# Patient Record
Sex: MALE | Race: WHITE | Employment: FULL TIME | URBAN - NONMETROPOLITAN AREA
[De-identification: names, ages, dates, MRNs, and addresses within clinical notes are randomized per-mention and may not be internally consistent; named-entity substitution may affect disease eponyms.]

---

## 2020-08-22 ENCOUNTER — OFFICE VISIT (OUTPATIENT)
Dept: PRIMARY CARE CLINIC | Age: 35
End: 2020-08-22

## 2020-08-22 VITALS
TEMPERATURE: 98 F | OXYGEN SATURATION: 98 % | DIASTOLIC BLOOD PRESSURE: 80 MMHG | HEART RATE: 74 BPM | WEIGHT: 151 LBS | SYSTOLIC BLOOD PRESSURE: 112 MMHG

## 2020-08-22 PROCEDURE — 99212 OFFICE O/P EST SF 10 MIN: CPT

## 2020-08-22 PROCEDURE — 99202 OFFICE O/P NEW SF 15 MIN: CPT | Performed by: FAMILY MEDICINE

## 2020-08-22 RX ORDER — POLYMYXIN B SULFATE AND TRIMETHOPRIM 1; 10000 MG/ML; [USP'U]/ML
1 SOLUTION OPHTHALMIC EVERY 6 HOURS
Qty: 10 ML | Refills: 0 | Status: SHIPPED | OUTPATIENT
Start: 2020-08-22 | End: 2020-08-27

## 2020-08-22 ASSESSMENT — PATIENT HEALTH QUESTIONNAIRE - PHQ9
SUM OF ALL RESPONSES TO PHQ QUESTIONS 1-9: 0
SUM OF ALL RESPONSES TO PHQ QUESTIONS 1-9: 0
1. LITTLE INTEREST OR PLEASURE IN DOING THINGS: 0
2. FEELING DOWN, DEPRESSED OR HOPELESS: 0
SUM OF ALL RESPONSES TO PHQ9 QUESTIONS 1 & 2: 0

## 2020-08-22 ASSESSMENT — ENCOUNTER SYMPTOMS
DIARRHEA: 0
NAUSEA: 0
SHORTNESS OF BREATH: 0
COUGH: 0
EYE DISCHARGE: 0
DOUBLE VISION: 0
ABDOMINAL PAIN: 0
PHOTOPHOBIA: 1
EYE REDNESS: 1
SINUS PRESSURE: 0
EYE PAIN: 1
SORE THROAT: 0

## 2020-08-22 ASSESSMENT — VISUAL ACUITY: OU: 1

## 2020-08-22 NOTE — PROGRESS NOTES
[address] [place] [address]  Dept: [phone]  Dept Fax: [phone]  Loc: [phone]    Annie Box is a 28 y.o. male who presents today for his medical conditions/complaints as notedbelow. Annie Box is c/o of   Chief Complaint   Patient presents with    Eye Pain     right eye        HPI:     Conjunctivitis    The current episode started yesterday. The onset was sudden. The problem occurs continuously. The problem has been gradually worsening. The problem is moderate. Nothing relieves the symptoms. The symptoms are aggravated by light. Associated symptoms include photophobia, headaches, eye pain and eye redness. Pertinent negatives include no fever, no decreased vision, no double vision, no abdominal pain, no diarrhea, no nausea, no congestion, no ear discharge, no ear pain, no hearing loss, no sore throat, no cough, no URI, no rash and no eye discharge. The eye pain is moderate. The right eye is affected. The eye pain is not associated with movement. The eyelid exhibits swelling. There were no sick contacts. Possible foreign body    History reviewed. No pertinent past medical history. Social History     Tobacco Use    Smoking status: Current Every Day Smoker    Smokeless tobacco: Never Used   Substance Use Topics    Alcohol use: Not on file     Current Outpatient Medications   Medication Sig Dispense Refill    trimethoprim-polymyxin b (POLYTRIM) 55715-0.1 UNIT/ML-% ophthalmic solution Place 1 drop into the right eye every 6 hours for 5 days 10 mL 0     No current facility-administered medications for this visit. No Known Allergies    Subjective:     Review of Systems   Constitutional: Negative for activity change, appetite change, chills, fatigue and fever.    HENT: Negative for congestion, ear discharge, ear pain, hearing loss, postnasal drip, sinus pressure, sneezing and sore throat. Eyes: Positive for photophobia, pain and redness. Negative for double vision, discharge and visual disturbance. Respiratory: Negative for cough and shortness of breath. Cardiovascular: Negative for chest pain and palpitations. Gastrointestinal: Negative for abdominal pain, diarrhea and nausea. Skin: Negative for rash. Neurological: Positive for headaches. Objective:      Physical Exam  Vitals signs and nursing note reviewed. Constitutional:       Appearance: He is well-developed. HENT:      Right Ear: Tympanic membrane, ear canal and external ear normal.      Left Ear: Tympanic membrane, ear canal and external ear normal.      Nose: Mucosal edema present. Right Sinus: No maxillary sinus tenderness or frontal sinus tenderness. Left Sinus: No maxillary sinus tenderness or frontal sinus tenderness. Mouth/Throat:      Pharynx: Posterior oropharyngeal erythema present. Eyes:      General: Lids are everted, no foreign bodies appreciated. Vision grossly intact. Conjunctiva/sclera: Conjunctivae normal.      Slit lamp exam:     Right eye: Photophobia present. No corneal ulcer or foreign body. Comments: Right upper eyelid is swollen. Small scratch on cornea   Neck:      Musculoskeletal: Normal range of motion and neck supple. Cardiovascular:      Rate and Rhythm: Normal rate and regular rhythm. Heart sounds: Normal heart sounds. No murmur. Pulmonary:      Effort: Pulmonary effort is normal. No respiratory distress. Breath sounds: Normal breath sounds. No wheezing. Abdominal:      General: Bowel sounds are normal.      Palpations: Abdomen is soft. Tenderness: There is no abdominal tenderness. There is no guarding. Lymphadenopathy:      Cervical: No cervical adenopathy.        /80 (Site: Left Upper Arm, Position: Sitting, Cuff Size: Large Adult)   Pulse 74   Temp 98 °F (36.7 °C) (Tympanic)   Wt 151 lb (68.5 kg)   SpO2 98% Assessment:       Diagnosis Orders   1. Abrasion of right cornea, initial encounter               Plan:        Abrasion of cornea: new; I will treat with polytrim drops. He was told to use warm compresses as needed for comfort. Return if symptoms worsen or fail to improve. Orders Placed This Encounter   Medications    trimethoprim-polymyxin b (POLYTRIM) 31459-2.1 UNIT/ML-% ophthalmic solution     Sig: Place 1 drop into the right eye every 6 hours for 5 days     Dispense:  10 mL     Refill:  0       Patientgiven educational materials - see patient instructions. Discussed use, benefit,and side effects of prescribed medications. All patient questions answered. Ptvoiced understanding. Reviewed health maintenance. Instructed to continue currentmedications, diet and exercise. Patient agreed with treatment plan. Follow up asdirected.      Electronically signed by Aislinn Rivers MD on 8/22/2020 at 12:37 PM

## 2020-12-08 ENCOUNTER — HOSPITAL ENCOUNTER (EMERGENCY)
Age: 35
Discharge: HOME | End: 2020-12-08
Attending: SPECIALIST

## 2020-12-08 ENCOUNTER — APPOINTMENT (OUTPATIENT)
Dept: GENERAL RADIOLOGY | Age: 35
End: 2020-12-08

## 2020-12-08 VITALS
RESPIRATION RATE: 18 BRPM | HEIGHT: 70 IN | OXYGEN SATURATION: 99 % | TEMPERATURE: 96.8 F | WEIGHT: 155 LBS | HEART RATE: 90 BPM | DIASTOLIC BLOOD PRESSURE: 96 MMHG | SYSTOLIC BLOOD PRESSURE: 124 MMHG | BODY MASS INDEX: 22.19 KG/M2

## 2020-12-08 PROCEDURE — 6370000000 HC RX 637 (ALT 250 FOR IP): Performed by: SPECIALIST

## 2020-12-08 PROCEDURE — 29125 APPL SHORT ARM SPLINT STATIC: CPT

## 2020-12-08 PROCEDURE — 73130 X-RAY EXAM OF HAND: CPT

## 2020-12-08 PROCEDURE — 99284 EMERGENCY DEPT VISIT MOD MDM: CPT

## 2020-12-08 RX ORDER — IBUPROFEN 600 MG/1
600 TABLET ORAL ONCE
Status: COMPLETED | OUTPATIENT
Start: 2020-12-08 | End: 2020-12-08

## 2020-12-08 RX ORDER — IBUPROFEN 600 MG/1
600 TABLET ORAL EVERY 6 HOURS PRN
Qty: 20 TABLET | Refills: 0 | Status: SHIPPED | OUTPATIENT
Start: 2020-12-08 | End: 2021-01-04

## 2020-12-08 RX ADMIN — IBUPROFEN 600 MG: 600 TABLET, FILM COATED ORAL at 22:39

## 2020-12-08 SDOH — HEALTH STABILITY: MENTAL HEALTH: HOW OFTEN DO YOU HAVE A DRINK CONTAINING ALCOHOL?: NEVER

## 2020-12-08 ASSESSMENT — PAIN DESCRIPTION - FREQUENCY: FREQUENCY: CONTINUOUS

## 2020-12-08 ASSESSMENT — ENCOUNTER SYMPTOMS
ABDOMINAL PAIN: 0
COUGH: 0
NAUSEA: 0
SHORTNESS OF BREATH: 0

## 2020-12-08 ASSESSMENT — PAIN SCALES - GENERAL
PAINLEVEL_OUTOF10: 7
PAINLEVEL_OUTOF10: 6

## 2020-12-08 ASSESSMENT — PAIN DESCRIPTION - PAIN TYPE: TYPE: ACUTE PAIN

## 2020-12-08 ASSESSMENT — PAIN DESCRIPTION - LOCATION: LOCATION: HAND

## 2020-12-08 ASSESSMENT — PAIN DESCRIPTION - PROGRESSION: CLINICAL_PROGRESSION: GRADUALLY IMPROVING

## 2020-12-08 ASSESSMENT — PAIN DESCRIPTION - DESCRIPTORS: DESCRIPTORS: ACHING

## 2020-12-08 ASSESSMENT — PAIN DESCRIPTION - ORIENTATION: ORIENTATION: RIGHT

## 2020-12-09 NOTE — ED PROVIDER NOTES
[address]      Pt Name: Regla Zaman  MRN: [id number]  [place] 1985  Date of evaluation: 12/8/2020      CHIEF COMPLAINT       Chief Complaint   Patient presents with    Hand Injury         HISTORY OF PRESENT ILLNESS    Regla Zaman is a 28 y.o. male who presents to the emergency department for evaluation of pain and swelling in the right hand and the fifth metacarpal region. Patient states he got mad at something and punched the wall 4 days ago. The swelling has decreased slightly but he continues having pain which is constant dull aching in nature 7 out of 10 in intensity worse with the movements and better with rest.  He denies any tingling, numbness or weakness distally. Patient is right-hand dominant and denies any previous injuries to the right hand. He does not take any blood thinners. Patient has not taken any medications for the pain. He denies any wrist pain denies any other injuries. REVIEW OF SYSTEMS       Review of Systems   Constitutional: Negative for chills and fever. Respiratory: Negative for cough and shortness of breath. Cardiovascular: Negative for chest pain and palpitations. Gastrointestinal: Negative for abdominal pain and nausea. Musculoskeletal: Positive for arthralgias, joint swelling and myalgias. Neurological: Negative for weakness and numbness. All other systems reviewed and are negative. PAST MEDICAL HISTORY    has no past medical history on file. SURGICAL HISTORY      has no past surgical history on file. CURRENT MEDICATIONS       Discharge Medication List as of 12/8/2020 10:48 PM          ALLERGIES     has No Known Allergies. FAMILY HISTORY     has no family status information on file. family history is not on file. SOCIAL HISTORY      reports that he has been smoking. He has been smoking about 1.00 pack per day.  He has never used smokeless tobacco. He reports that he does not drink alcohol or use drugs. PHYSICAL EXAM     INITIAL VITALS:  height is 5' 10\" (1.778 m) and weight is 155 lb (70.3 kg). His temperature is 96.8 °F (36 °C). His blood pressure is 124/96 (abnormal) and his pulse is 90. His respiration is 18 and oxygen saturation is 99%. Physical Exam  Vitals signs and nursing note reviewed. Constitutional:       Appearance: He is well-developed. HENT:      Head: Normocephalic and atraumatic. Nose: Nose normal.   Eyes:      Extraocular Movements: Extraocular movements intact. Pupils: Pupils are equal, round, and reactive to light. Neck:      Musculoskeletal: Normal range of motion and neck supple. Cardiovascular:      Rate and Rhythm: Normal rate and regular rhythm. Heart sounds: Normal heart sounds. No murmur. Pulmonary:      Effort: Pulmonary effort is normal. No respiratory distress. Breath sounds: Normal breath sounds. Abdominal:      General: Bowel sounds are normal. There is no distension. Palpations: Abdomen is soft. Tenderness: There is no abdominal tenderness. Musculoskeletal:      Right hand: He exhibits decreased range of motion, tenderness, deformity and swelling. Normal sensation noted. Normal strength noted. Skin:     General: Skin is warm and dry. Neurological:      General: No focal deficit present. Mental Status: He is alert and oriented to person, place, and time. DIFFERENTIAL DIAGNOSIS/ MDM:     Boxer's fracture, contusion, dislocation    DIAGNOSTIC RESULTS     EKG: All EKG's are interpreted by the Emergency Department Physician who either signs or Co-signs this chart in the absence of a cardiologist.    None obtained      RADIOLOGY:   I directly visualized the following  images and reviewed the radiologist interpretations:    Xr Hand Right (min 3 Views)    Result Date: 12/8/2020  EXAMINATION: THREE XRAY VIEWS OF THE RIGHT HAND 12/8/2020 10:18 pm COMPARISON: None.  HISTORY: ORDERING SYSTEM PROVIDED HISTORY: Punched the wall TECHNOLOGIST PROVIDED HISTORY: Punched the wall Reason for Exam: Punched a wall. Swelling to the 5th digit Acuity: Acute Type of Exam: Initial FINDINGS: There is an acute volarly angulated fracture involving the distal 5th metacarpal.  The remaining osseous structures appear intact. The joint spaces appear maintained. There is perhaps minimal soft tissue swelling along the ulnar aspect of the hand. Acute boxer's fracture. ED BEDSIDE ULTRASOUND:       LABS:  Labs Reviewed - No data to display        EMERGENCY DEPARTMENT COURSE:   Vitals:    Vitals:    12/08/20 2154 12/08/20 2200 12/08/20 2255   BP: (!) 138/97  (!) 124/96   Pulse: 90  90   Resp: 16  18   Temp:  96.8 °F (36 °C)    SpO2: 99%  99%   Weight: 155 lb (70.3 kg)     Height: 5' 10\" (1.778 m)       -------------------------  BP: (!) 124/96, Temp: 96.8 °F (36 °C), Pulse: 90, Resp: 18    Orders Placed This Encounter   Medications    ibuprofen (ADVIL;MOTRIN) tablet 600 mg    ibuprofen (IBU) 600 MG tablet     Sig: Take 1 tablet by mouth every 6 hours as needed for Pain     Dispense:  20 tablet     Refill:  0       During the emergency department course, ice packs were applied locally and patient was given Motrin 600 mg orally. Ulnar gutter splint was applied on the right hand. Please see procedure note. I applied the splint and neurovascular examination is intact after splint application. Plan is to discharge the patient with instructions to apply ice packs locally, elevate the right hand, take Tylenol and ibuprofen as needed for the pain, follow-up with Dr. Mayra Sutton, call his office tomorrow morning for appointment, return if worse. I have reviewed the disposition diagnosis with the patient and or their family/guardian. I have answered their questions and given discharge instructions. They voiced understanding of these instructions and did not have any further questions or complaints.     Re-evaluation Notes    Patient is feeling much better and resting comfortably. CRITICAL CARE:   None        CONSULTS:      PROCEDURES:  Splint Application    Date/Time: 12/9/2020 3:16 AM  Performed by: Randal Cline MD  Authorized by: Randal Cline MD     Consent:     Consent obtained:  Verbal    Consent given by:  Patient    Risks discussed:  Numbness and swelling    Alternatives discussed:  No treatment, alternative treatment and referral  Pre-procedure details:     Sensation:  Normal  Procedure details:     Laterality:  Right    Location:  Hand    Splint type:  Ulnar gutter    Supplies:  Cotton padding, elastic bandage and Ortho-Glass  Post-procedure details:     Pain:  Improved    Sensation:  Normal    Patient tolerance of procedure: Tolerated well, no immediate complications        FINAL IMPRESSION      1. Closed boxer's fracture, initial encounter          DISPOSITION/PLAN   DISPOSITION Decision To Discharge    Condition on Disposition    Stable    PATIENT REFERRED TO:  Karen Cespedes MD  [place] [phone]     Call in 1 day  For reevaluation of current symptoms    [facility] ED  [facility].  [phone]    If symptoms worsen      DISCHARGE MEDICATIONS:  Discharge Medication List as of 12/8/2020 10:48 PM      START taking these medications    Details   ibuprofen (IBU) 600 MG tablet Take 1 tablet by mouth every 6 hours as needed for Pain, Disp-20 tablet,R-0Print             (Please note that portions of this note were completed with a voice recognition program.  Efforts were made to edit the dictations but occasionally words are mis-transcribed.)    Webb MD, F.A.C.E.P.   Attending Emergency Physician                         Randal Cline MD  12/09/20 [id number]

## 2020-12-09 NOTE — ED TRIAGE NOTES
Pt punched a wall 4 days ago with right hand. Swelling was improving and they began getting worse. Pain has improved.

## 2020-12-11 ENCOUNTER — OFFICE VISIT (OUTPATIENT)
Dept: ORTHOPEDIC SURGERY | Age: 35
End: 2020-12-11

## 2020-12-11 VITALS
HEIGHT: 70 IN | DIASTOLIC BLOOD PRESSURE: 80 MMHG | SYSTOLIC BLOOD PRESSURE: 118 MMHG | WEIGHT: 155 LBS | BODY MASS INDEX: 22.19 KG/M2 | HEART RATE: 106 BPM

## 2020-12-11 PROCEDURE — 99214 OFFICE O/P EST MOD 30 MIN: CPT | Performed by: PHYSICIAN ASSISTANT

## 2020-12-11 PROCEDURE — 29075 APPL CST ELBW FNGR SHORT ARM: CPT | Performed by: PHYSICIAN ASSISTANT

## 2020-12-11 PROCEDURE — 26600 TREAT METACARPAL FRACTURE: CPT | Performed by: PHYSICIAN ASSISTANT

## 2020-12-18 NOTE — PROGRESS NOTES
Orthopedic Office Note  [facility] [address]  [address], [address]  [facility] [address] [phone]      CHIEF COMPLAINT:    Chief Complaint   Patient presents with    Hand Pain     right hand fx       HISTORY OF PRESENT ILLNESS:      The patient is a 28 y.o. male  who presents today for evaluation and treatment of a injury sustained on the eighth. He reports punching something and frustration. Patient had acute onset of pain and discomfort and presented to the emergency room. He was told he had a fracture and placed in a splint. He was then referred here for evaluation and further treatment. The patient states that it is sore and achy. He denies previous problems with the hand. Denies paresthesias. Past Medical History:    History reviewed. No pertinent past medical history. Past Surgical History:    History reviewed. No pertinent surgical history. Medications Prior to Admission:   Current Outpatient Medications   Medication Sig Dispense Refill    ibuprofen (IBU) 600 MG tablet Take 1 tablet by mouth every 6 hours as needed for Pain 20 tablet 0     No current facility-administered medications for this visit. Allergies:  Patient has no known allergies. Social History:   Social History     Tobacco Use   Smoking Status Current Every Day Smoker    Packs/day: 1.00   Smokeless Tobacco Never Used     Social History     Substance and Sexual Activity   Alcohol Use Never    Frequency: Never     Social History     Substance and Sexual Activity   Drug Use Never       Family History:  History reviewed. No pertinent family history. REVIEW OF SYSTEMS:  Please see the ROS form attached to today's encounter. I have reviewed it with the patient during the visit. All other systems were reviewed and are negative. PHYSICAL EXAM:  On examination, the splint has been removed. Skin is intact.   He is tender to palpation along the fifth metacarpal.  No obvious deformity to the hand. No erythema or warmth. He has pain with range of motion. No open wounds. Radiology:  Radiographs from [facility] were reviewed and show a 5th metacarpal fracture with angulation. ASSESSMENT/PLAN:  1. Closed displaced fracture of other part of fifth metacarpal bone of right hand, initial encounter        We have discussed continued treatment with the patient. He is not interested in surgery. I do believe that this will heal conservatively. The patient understands that he will lose the normal metacarpal cascade when making a fist.  I have recommended a cast.  A well-padded ulnar gutter cast was applied today in clinic. The patient tolerated the casting well. Cast care instructions were discussed. We will follow-up in 1 week for repeat x-rays and recheck. No orders of the defined types were placed in this encounter.        Opal Willard PA-C

## 2020-12-21 ENCOUNTER — HOSPITAL ENCOUNTER (OUTPATIENT)
Dept: GENERAL RADIOLOGY | Age: 35
Discharge: HOME | End: 2020-12-23

## 2020-12-21 ENCOUNTER — OFFICE VISIT (OUTPATIENT)
Dept: ORTHOPEDIC SURGERY | Age: 35
End: 2020-12-21

## 2020-12-21 VITALS
HEIGHT: 70 IN | HEART RATE: 75 BPM | WEIGHT: 155 LBS | SYSTOLIC BLOOD PRESSURE: 136 MMHG | BODY MASS INDEX: 22.19 KG/M2 | DIASTOLIC BLOOD PRESSURE: 60 MMHG

## 2020-12-21 PROCEDURE — 99024 POSTOP FOLLOW-UP VISIT: CPT | Performed by: ORTHOPAEDIC SURGERY

## 2020-12-21 PROCEDURE — 99211 OFF/OP EST MAY X REQ PHY/QHP: CPT

## 2020-12-21 PROCEDURE — 73130 X-RAY EXAM OF HAND: CPT

## 2020-12-21 NOTE — PROGRESS NOTES
Orthopedic Office Note  [facility] [address]  [address] [facility], [address]  [facility] [address] [phone]      CHIEF COMPLAINT:    Chief Complaint   Patient presents with    Hand Injury     rech right hand fracture       HISTORY OF PRESENT ILLNESS:      The patient is a 28 y.o. male  who presents today for follow-up of his right fifth metacarpal neck fracture doing well. He denies having pain    Past Medical History:    No past medical history on file. Past Surgical History:    No past surgical history on file. Medications Prior to Admission:   Current Outpatient Medications   Medication Sig Dispense Refill    ibuprofen (IBU) 600 MG tablet Take 1 tablet by mouth every 6 hours as needed for Pain 20 tablet 0     No current facility-administered medications for this visit. Allergies:  Patient has no known allergies. Social History:   Social History     Tobacco Use   Smoking Status Current Every Day Smoker    Packs/day: 1.00   Smokeless Tobacco Never Used     Social History     Substance and Sexual Activity   Alcohol Use Never    Frequency: Never     Social History     Substance and Sexual Activity   Drug Use Never       Family History:  No family history on file. REVIEW OF SYSTEMS:  Please see the ROS form attached to today's encounter. I have reviewed it with the patient during the visit. All other systems were reviewed and are negative. PHYSICAL EXAM:  Cast is intact. He has good capillary refill in the finger. Radiology:  X-rays show a displaced/angulated fifth metacarpal neck fracture    ASSESSMENT/PLAN:  1. Closed displaced fracture of fifth metacarpal bone of right hand with routine healing, unspecified portion of metacarpal, subsequent encounter        Treatment options were discussed with the patient. He is interested in nonoperative treatment.   He will follow-up in 2 weeks to remove the cast and

## 2021-01-04 ENCOUNTER — HOSPITAL ENCOUNTER (OUTPATIENT)
Dept: GENERAL RADIOLOGY | Age: 36
Discharge: HOME | End: 2021-01-06

## 2021-01-04 ENCOUNTER — OFFICE VISIT (OUTPATIENT)
Dept: ORTHOPEDIC SURGERY | Age: 36
End: 2021-01-04

## 2021-01-04 VITALS
DIASTOLIC BLOOD PRESSURE: 82 MMHG | WEIGHT: 155 LBS | HEART RATE: 99 BPM | SYSTOLIC BLOOD PRESSURE: 132 MMHG | HEIGHT: 70 IN | BODY MASS INDEX: 22.19 KG/M2

## 2021-01-04 DIAGNOSIS — S62.306D CLOSED DISPLACED FRACTURE OF FIFTH METACARPAL BONE OF RIGHT HAND WITH ROUTINE HEALING, UNSPECIFIED PORTION OF METACARPAL, SUBSEQUENT ENCOUNTER: Primary | ICD-10-CM

## 2021-01-04 DIAGNOSIS — S62.306D CLOSED DISPLACED FRACTURE OF FIFTH METACARPAL BONE OF RIGHT HAND WITH ROUTINE HEALING, UNSPECIFIED PORTION OF METACARPAL, SUBSEQUENT ENCOUNTER: ICD-10-CM

## 2021-01-04 PROCEDURE — 99024 POSTOP FOLLOW-UP VISIT: CPT | Performed by: ORTHOPAEDIC SURGERY

## 2021-01-04 PROCEDURE — 73130 X-RAY EXAM OF HAND: CPT

## 2021-01-04 PROCEDURE — 99214 OFFICE O/P EST MOD 30 MIN: CPT

## 2021-01-04 NOTE — PROGRESS NOTES
Orthopedic Office Note  [facility] [address]  [address] [facility], [address]  [facility] [address] [phone]      CHIEF COMPLAINT:  No chief complaint on file. HISTORY OF PRESENT ILLNESS:      The patient is a 28 y.o. male  who presents today for follow-up of his right fifth metacarpal neck fracture. Past Medical History:    No past medical history on file. Past Surgical History:    No past surgical history on file. Medications Prior to Admission:   Current Outpatient Medications   Medication Sig Dispense Refill    ibuprofen (IBU) 600 MG tablet Take 1 tablet by mouth every 6 hours as needed for Pain 20 tablet 0     No current facility-administered medications for this visit. Allergies:  Patient has no known allergies. Social History:   Social History     Tobacco Use   Smoking Status Current Every Day Smoker    Packs/day: 1.00   Smokeless Tobacco Never Used     Social History     Substance and Sexual Activity   Alcohol Use Never    Frequency: Never     Social History     Substance and Sexual Activity   Drug Use Never       Family History:  No family history on file. REVIEW OF SYSTEMS:  Please see the ROS form attached to today's encounter. I have reviewed it with the patient during the visit. All other systems were reviewed and are negative. PHYSICAL EXAM:  Cast was removed and skin is intact. No gross malalignment of the finger. Good capillary refill. He has full finger extension and is about 2 cm short of full flexion. Radiology:  X-rays show a angulated fifth metacarpal neck fracture with abundant callus formation    ASSESSMENT/PLAN:  1. Closed displaced fracture of fifth metacarpal bone of right hand with routine healing, unspecified portion of metacarpal, subsequent encounter        His cast was discontinued today in clinic. He will work on restoring full finger range of motion.   Home exercises were reviewed. He will follow-up in 1 month to repeat x-rays and reassess his progress. He will continue modify activities to avoid any lifting more than a few pounds with the right hand. No orders of the defined types were placed in this encounter.        Florinda Polanco MD

## 2021-01-26 DIAGNOSIS — S62.306D CLOSED DISPLACED FRACTURE OF FIFTH METACARPAL BONE OF RIGHT HAND WITH ROUTINE HEALING, UNSPECIFIED PORTION OF METACARPAL, SUBSEQUENT ENCOUNTER: Primary | ICD-10-CM
